# Patient Record
Sex: FEMALE | Race: WHITE
[De-identification: names, ages, dates, MRNs, and addresses within clinical notes are randomized per-mention and may not be internally consistent; named-entity substitution may affect disease eponyms.]

---

## 2024-03-04 ENCOUNTER — APPOINTMENT (OUTPATIENT)
Dept: ORTHOPEDIC SURGERY | Facility: CLINIC | Age: 64
End: 2024-03-04
Payer: MEDICARE

## 2024-03-04 VITALS
WEIGHT: 97 LBS | DIASTOLIC BLOOD PRESSURE: 77 MMHG | HEART RATE: 58 BPM | BODY MASS INDEX: 19.04 KG/M2 | SYSTOLIC BLOOD PRESSURE: 116 MMHG | HEIGHT: 60 IN

## 2024-03-04 DIAGNOSIS — Z78.9 OTHER SPECIFIED HEALTH STATUS: ICD-10-CM

## 2024-03-04 DIAGNOSIS — S99.922A UNSPECIFIED INJURY OF LEFT FOOT, INITIAL ENCOUNTER: ICD-10-CM

## 2024-03-04 DIAGNOSIS — Z78.0 ASYMPTOMATIC MENOPAUSAL STATE: ICD-10-CM

## 2024-03-04 PROCEDURE — 73630 X-RAY EXAM OF FOOT: CPT | Mod: LT

## 2024-03-04 PROCEDURE — 99203 OFFICE O/P NEW LOW 30 MIN: CPT

## 2024-03-04 RX ORDER — IBUPROFEN 200 MG/1
200 TABLET, COATED ORAL
Refills: 0 | Status: ACTIVE | COMMUNITY

## 2024-03-04 NOTE — DISCUSSION/SUMMARY
[de-identified] : The patient had a contusion to her left foot.  She does not have evidence of fracture.  I have discussed the pathology, natural history and treatment options with her.  Treatment will be ice, elevation and a comfortable shoe.  If not improved in 2 weeks she will be reevaluated.

## 2024-03-04 NOTE — PHYSICAL EXAM
[Slightly Antalgic] : slightly antalgic [LE] : Sensory: Intact in bilateral lower extremities [DP] : dorsalis pedis 2+ and symmetric bilaterally [Normal] : Alert and in no acute distress [PT] : posterior tibial 2+ and symmetric bilaterally [Poor Appearance] : well-appearing [Acute Distress] : not in acute distress [Obese] : not obese [de-identified] : The patient has no respiratory distress. Mood and affect are normal. The patient is alert and oriented to person, place and time. The patient has no pain with motion of the knees.  She has mild swelling of the left ankle.  There is mild tenderness on the dorsum of the left foot in the mid metatarsal region.  There is swelling and ecchymosis of the toes although they are nontender.  The ankle is stable. [de-identified] : Full Report IMPRESSION:   The examination demonstrates a mild to moderate hallux valgus and soft tissue bunion deformity with associated lateralization of the sesamoid bones. Alignment is otherwise anatomic. The joint spaces are maintained. The bones are demineralized which limits evaluation for subtle nondisplaced fractures. No displaced fracture is seen. There is a questionable subtle transverse lucency involving the distal left second metatarsal diaphysis suspicious for a nondisplaced stress fracture. MRI of the foot could be obtained for further characterization, as clinically indicated. Note is made of mild dorsal forefoot soft tissue swelling.     ACCESSION #: [id number]   EXAM:  XR FOOT 3 OR MORE VIEWS LEFT    EXAM DATE: 2/29/2024 4:32 PM.   CLINICAL DATA: 63-year-old female with left foot pain.   COMPARISON: None.   Electronic Signature: I personally reviewed the images and agree with this report. Final Report: Dictated by  and Signed by Attending Johann Gunn MD 3/1/2024 8:53 AM   AP, lateral and oblique x-rays of the left foot taken today demonstrate no fracture, no dislocation and no bony abnormality.  She does have hallux valgus deformity.

## 2024-03-12 ENCOUNTER — APPOINTMENT (OUTPATIENT)
Dept: ORTHOPEDIC SURGERY | Facility: CLINIC | Age: 64
End: 2024-03-12
Payer: MEDICARE

## 2024-03-12 VITALS
HEIGHT: 60.5 IN | DIASTOLIC BLOOD PRESSURE: 70 MMHG | BODY MASS INDEX: 18.06 KG/M2 | WEIGHT: 94.4 LBS | SYSTOLIC BLOOD PRESSURE: 110 MMHG | HEART RATE: 60 BPM

## 2024-03-12 PROCEDURE — 99214 OFFICE O/P EST MOD 30 MIN: CPT | Mod: 57

## 2024-03-12 PROCEDURE — 73630 X-RAY EXAM OF FOOT: CPT | Mod: LT

## 2024-03-12 PROCEDURE — 28470 CLTX METATARSAL FX WO MNP EA: CPT | Mod: LT

## 2024-03-12 NOTE — DISCUSSION/SUMMARY
[de-identified] : The patient has a fracture of the left second metatarsal.  I have discussed the pathology, natural history and treatment options with her.  She is placed in a cam boot.  She may bear weight to tolerance in the boot.  She will be reevaluated in 2 weeks.  The patient is in agreement with the plan.

## 2024-03-12 NOTE — PHYSICAL EXAM
[Slightly Antalgic] : slightly antalgic [LE] : Sensory: Intact in bilateral lower extremities [DP] : dorsalis pedis 2+ and symmetric bilaterally [PT] : posterior tibial 2+ and symmetric bilaterally [Normal] : Alert and in no acute distress [Acute Distress] : not in acute distress [Poor Appearance] : well-appearing [Obese] : not obese [de-identified] : The patient has no respiratory distress. Mood and affect are normal. The patient is alert and oriented to person, place and time. The patient has no pain with motion of the knees.  She has mild swelling of the left ankle and foot.  There is tenderness on the dorsum of the left foot in the mid metatarsal region.  The ankle is stable. [de-identified] : AP, lateral and oblique x-rays of the left foot taken today demonstrate transverse fracture of the distal second metatarsal shaft.  There is slight displacement.  Review of previous x-rays failed to demonstrate a fracture.

## 2024-03-12 NOTE — HISTORY OF PRESENT ILLNESS
[de-identified] : The patient presents for reevaluation of left foot pain. She feels the pain has worsened and has increased swelling in the foot. She has not been able to put on closed toe shoes due to the pain and swelling. She has been taking Advil with some relief.

## 2024-03-28 ENCOUNTER — APPOINTMENT (OUTPATIENT)
Dept: ORTHOPEDIC SURGERY | Facility: CLINIC | Age: 64
End: 2024-03-28

## 2024-03-28 VITALS
SYSTOLIC BLOOD PRESSURE: 99 MMHG | DIASTOLIC BLOOD PRESSURE: 63 MMHG | WEIGHT: 95 LBS | HEIGHT: 60.5 IN | BODY MASS INDEX: 18.17 KG/M2 | HEART RATE: 66 BPM

## 2024-03-28 PROCEDURE — 73630 X-RAY EXAM OF FOOT: CPT | Mod: LT

## 2024-03-28 PROCEDURE — 99213 OFFICE O/P EST LOW 20 MIN: CPT | Mod: 1L

## 2024-03-28 NOTE — DISCUSSION/SUMMARY
[de-identified] : The patient's left second metatarsal fracture is healing.  At this point we will keep her in the cam boot.  I would like to reevaluate her in 2 weeks with new x-rays.

## 2024-03-28 NOTE — PHYSICAL EXAM
[Slightly Antalgic] : slightly antalgic [LE] : Sensory: Intact in bilateral lower extremities [DP] : dorsalis pedis 2+ and symmetric bilaterally [PT] : posterior tibial 2+ and symmetric bilaterally [Normal] : Alert and in no acute distress [Poor Appearance] : well-appearing [Acute Distress] : not in acute distress [Obese] : not obese [de-identified] : The patient has no respiratory distress. Mood and affect are normal. The patient is alert and oriented to person, place and time. The patient has no pain with motion of the knees.  She has mild swelling of the left ankle and foot.  There is mild tenderness on the dorsum of the left foot in the mid metatarsal region.  The ankle is stable. [de-identified] : AP, lateral and oblique x-rays of the left foot taken today demonstrate healing at the second metatarsal fracture with callus formation

## 2024-04-16 ENCOUNTER — APPOINTMENT (OUTPATIENT)
Dept: ORTHOPEDIC SURGERY | Facility: CLINIC | Age: 64
End: 2024-04-16
Payer: MEDICARE

## 2024-04-16 VITALS
HEART RATE: 59 BPM | DIASTOLIC BLOOD PRESSURE: 61 MMHG | SYSTOLIC BLOOD PRESSURE: 96 MMHG | BODY MASS INDEX: 18.17 KG/M2 | HEIGHT: 60.5 IN | WEIGHT: 95 LBS

## 2024-04-16 PROCEDURE — 73630 X-RAY EXAM OF FOOT: CPT | Mod: LT

## 2024-04-16 PROCEDURE — 99024 POSTOP FOLLOW-UP VISIT: CPT

## 2024-04-16 NOTE — DISCUSSION/SUMMARY
[de-identified] : The patient's left second metatarsal fracture has united.  She will discontinue use of the cam boot.  She may wear a comfortable shoe.  If symptomatic in 3 weeks she can be reevaluated.

## 2024-04-16 NOTE — PHYSICAL EXAM
[Slightly Antalgic] : slightly antalgic [LE] : Sensory: Intact in bilateral lower extremities [DP] : dorsalis pedis 2+ and symmetric bilaterally [PT] : posterior tibial 2+ and symmetric bilaterally [Normal] : Alert and in no acute distress [Poor Appearance] : well-appearing [Acute Distress] : not in acute distress [Obese] : not obese [de-identified] : The patient has no respiratory distress. Mood and affect are normal. The patient is alert and oriented to person, place and time. The patient has no pain with motion of the knees.  She has mild swelling of the left foot.  The foot is nontender.  There is no lymphedema. [de-identified] : AP, lateral and oblique x-rays of the left foot demonstrate union of left second metatarsal fracture

## 2024-05-03 ENCOUNTER — APPOINTMENT (OUTPATIENT)
Dept: ORTHOPEDIC SURGERY | Facility: CLINIC | Age: 64
End: 2024-05-03
Payer: MEDICARE

## 2024-05-03 VITALS
HEIGHT: 60.5 IN | WEIGHT: 90 LBS | DIASTOLIC BLOOD PRESSURE: 68 MMHG | HEART RATE: 49 BPM | SYSTOLIC BLOOD PRESSURE: 103 MMHG | BODY MASS INDEX: 17.21 KG/M2

## 2024-05-03 DIAGNOSIS — S92.322D DISPLACED FRACTURE OF SECOND METATARSAL BONE, LEFT FOOT, SUBSEQUENT ENCOUNTER FOR FRACTURE WITH ROUTINE HEALING: ICD-10-CM

## 2024-05-03 PROCEDURE — 73610 X-RAY EXAM OF ANKLE: CPT | Mod: LT

## 2024-05-03 PROCEDURE — 99024 POSTOP FOLLOW-UP VISIT: CPT

## 2024-05-03 PROCEDURE — 73630 X-RAY EXAM OF FOOT: CPT | Mod: LT

## 2024-05-03 NOTE — PHYSICAL EXAM
[Slightly Antalgic] : slightly antalgic [LE] : Sensory: Intact in bilateral lower extremities [DP] : dorsalis pedis 2+ and symmetric bilaterally [PT] : posterior tibial 2+ and symmetric bilaterally [Normal] : Alert and in no acute distress [Poor Appearance] : well-appearing [Acute Distress] : not in acute distress [Obese] : not obese [de-identified] : The patient has no respiratory distress. Mood and affect are normal. The patient is alert and oriented to person, place and time. The patient has no pain with motion of the knees.  She has moderate swelling of her left ankle and foot.  She has tenderness on the lateral aspect of the left foot.  Achilles tendon is intact.  The ankle is stable. [de-identified] : AP, lateral and mortise x-rays of the left ankle demonstrate no fracture or dislocation.  AP, lateral and oblique x-rays of the left foot demonstrate union of second metatarsal fracture.

## 2024-05-03 NOTE — HISTORY OF PRESENT ILLNESS
[de-identified] : The patient presents for reevaluation of left foot second metatarsal fracture, nearly 9 weeks from injury. She has been out of the cam boot and ambulating in regular shoes. She feels pain worsened since this transitioned, limping while walking. She has noticed significant swelling in the left ankle.

## 2024-08-12 ENCOUNTER — APPOINTMENT (OUTPATIENT)
Dept: ORTHOPEDIC SURGERY | Facility: CLINIC | Age: 64
End: 2024-08-12

## 2024-08-12 ENCOUNTER — APPOINTMENT (OUTPATIENT)
Dept: ORTHOPEDIC SURGERY | Facility: CLINIC | Age: 64
End: 2024-08-12
Payer: MEDICARE

## 2024-08-12 VITALS
DIASTOLIC BLOOD PRESSURE: 65 MMHG | HEART RATE: 58 BPM | SYSTOLIC BLOOD PRESSURE: 97 MMHG | HEIGHT: 59 IN | WEIGHT: 90 LBS | BODY MASS INDEX: 18.14 KG/M2

## 2024-08-12 DIAGNOSIS — M77.8 OTHER ENTHESOPATHIES, NOT ELSEWHERE CLASSIFIED: ICD-10-CM

## 2024-08-12 PROCEDURE — 73030 X-RAY EXAM OF SHOULDER: CPT | Mod: RT

## 2024-08-12 PROCEDURE — 99213 OFFICE O/P EST LOW 20 MIN: CPT

## 2025-01-15 NOTE — HISTORY OF PRESENT ILLNESS
show [de-identified] : The patient presents for reevaluation of left foot second metatarsal fracture, over 4 weeks from injury. She has been maintained in the cam boot. She reports swelling has decreased. She was taking Advil with good relief but stopped due to stomach upset.